# Patient Record
Sex: FEMALE | URBAN - NONMETROPOLITAN AREA
[De-identification: names, ages, dates, MRNs, and addresses within clinical notes are randomized per-mention and may not be internally consistent; named-entity substitution may affect disease eponyms.]

---

## 2020-10-01 ENCOUNTER — APPOINTMENT (RX ONLY)
Dept: URBAN - NONMETROPOLITAN AREA CLINIC 27 | Facility: CLINIC | Age: 30
Setting detail: DERMATOLOGY
End: 2020-10-01

## 2020-10-01 DIAGNOSIS — L24 IRRITANT CONTACT DERMATITIS: ICD-10-CM

## 2020-10-01 PROBLEM — L24.9 IRRITANT CONTACT DERMATITIS, UNSPECIFIED CAUSE: Status: ACTIVE | Noted: 2020-10-01

## 2020-10-01 PROCEDURE — ? PRESCRIPTION

## 2020-10-01 PROCEDURE — ? PATIENT SPECIFIC COUNSELING

## 2020-10-01 PROCEDURE — 99202 OFFICE O/P NEW SF 15 MIN: CPT

## 2020-10-01 PROCEDURE — ? COUNSELING

## 2020-10-01 RX ORDER — HYDROCORTISONE 25 MG/G
CREAM TOPICAL
Qty: 1 | Refills: 1 | Status: ERX | COMMUNITY
Start: 2020-10-01

## 2020-10-01 RX ADMIN — HYDROCORTISONE: 25 CREAM TOPICAL at 00:00

## 2020-10-01 ASSESSMENT — LOCATION SIMPLE DESCRIPTION DERM: LOCATION SIMPLE: LEFT CHEEK

## 2020-10-01 ASSESSMENT — LOCATION ZONE DERM: LOCATION ZONE: FACE

## 2020-10-01 ASSESSMENT — LOCATION DETAILED DESCRIPTION DERM: LOCATION DETAILED: LEFT INFERIOR CENTRAL MALAR CHEEK

## 2020-10-01 NOTE — PROCEDURE: PATIENT SPECIFIC COUNSELING
Detail Level: Detailed
Acute onset pruritic pink scaly patches on the eyelid and cheeks that has resolved. Rash is not present today though she has photos from 3 weeks earlier. Improved with stopping beauty products and using only bland emollients, such as Vaseline. \\nRecommended taking an Allegra or Zyrtec daily and a Benadryl at night if recurs. Apply hydrocortisone 2.5% cream at earliest sign of recurrence. \\nWill plan to schedule patch testing in our office.\\nHold now but after testing can slowly add back in vitamin C or just use the gentle products for now since she is getting  in December. Advised patient to bring her products with her next time for review.

## 2020-10-06 ENCOUNTER — APPOINTMENT (RX ONLY)
Dept: URBAN - NONMETROPOLITAN AREA CLINIC 27 | Facility: CLINIC | Age: 30
Setting detail: DERMATOLOGY
End: 2020-10-06

## 2020-10-06 DIAGNOSIS — L23.9 ALLERGIC CONTACT DERMATITIS, UNSPECIFIED CAUSE: ICD-10-CM

## 2020-10-06 PROCEDURE — 95044 PATCH/APPLICATION TESTS: CPT

## 2020-10-06 PROCEDURE — ? PATCH TESTING

## 2020-10-06 NOTE — PROCEDURE: PATCH TESTING
Post-Care Instructions: I reviewed with the patient in detail post-care instructions. Patient should not sweat, pick at, or get the patches wet for 48 hours.
Number Of Patches (Maximum Allowable Per Dos By Cms Is 90): 80
Detail Level: None
Consent: Verbal consent obtained, risks reviewed including but not limited to rash, itching, allergic reaction, systemic rash, remote possibility of anaphylaxis to allergen.

## 2020-10-08 ENCOUNTER — APPOINTMENT (RX ONLY)
Dept: URBAN - NONMETROPOLITAN AREA CLINIC 27 | Facility: CLINIC | Age: 30
Setting detail: DERMATOLOGY
End: 2020-10-08

## 2020-10-08 DIAGNOSIS — L73.8 OTHER SPECIFIED FOLLICULAR DISORDERS: ICD-10-CM

## 2020-10-08 DIAGNOSIS — L23.9 ALLERGIC CONTACT DERMATITIS, UNSPECIFIED CAUSE: ICD-10-CM

## 2020-10-08 PROCEDURE — ? COUNSELING

## 2020-10-08 PROCEDURE — ? NORTH AMERICAN 80 PATCH TEST READING

## 2020-10-08 PROCEDURE — 99213 OFFICE O/P EST LOW 20 MIN: CPT

## 2020-10-08 ASSESSMENT — LOCATION SIMPLE DESCRIPTION DERM: LOCATION SIMPLE: RIGHT CHEEK

## 2020-10-08 ASSESSMENT — LOCATION ZONE DERM: LOCATION ZONE: FACE

## 2020-10-08 ASSESSMENT — LOCATION DETAILED DESCRIPTION DERM: LOCATION DETAILED: RIGHT INFERIOR MEDIAL MALAR CHEEK

## 2020-10-08 NOTE — PROCEDURE: MIPS QUALITY
Quality 431: Preventive Care And Screening: Unhealthy Alcohol Use - Screening: Patient screened for unhealthy alcohol use using a single question and scores less than 2 times per year
Quality 226: Preventive Care And Screening: Tobacco Use: Screening And Cessation Intervention: Patient screened for tobacco use and is an ex/non-smoker
Quality 130: Documentation Of Current Medications In The Medical Record: Current Medications Documented
Quality 265: Biopsy Follow-Up: Biopsy results reviewed, communicated, tracked, and documented
Detail Level: Generalized

## 2020-10-08 NOTE — PROCEDURE: [employer]ICAN 80 PATCH TEST READING
Allergen 63 Reaction: no reaction
Name Of Allergen 48: Textile dye mix
Name Of Allergen 80: Oleamidopropyl Dimethylamine
Name Of Allergen 20: Nickelsulfate hexahydrate
Name Of Allergen 36: Ethyleneurea, melamine formaldehyde mix
Name Of Allergen 63: Iodopropynyl butyl carbamate
Name Of Allergen 35: Chloroxylenol (PCMX) / (4?Chloro?3,5?xylenol (PCMX))
Show Negative Results In The Note?: Yes
Name Of Allergen 22: Tocopherol/ (DL alpha tocopherol)
What Reading Time Point?: 48 hour
Name Of Allergen 58: Benzyl alcohol
Name Of Allergen 37: 2?tert?Butyl?4?methoxyphenol (BHA)
Allergen 48 Reaction: irritant reaction
Name Of Allergen 51: Disperse Yellow 3
Name Of Allergen 2: 2?Mercaptobenzothiazole (MBT)
Name Of Allergen 56: DMDM Hydantoin
Name Of Allergen 4: P?Phenylenediamine (PPD)
Name Of Allergen 32: Thimerosal (Merthiolate)
Name Of Allergen 40: Glyceryl monothioglycolate (GMTG)
Name Of Allergen 13: Epoxy resin Bisphenol A
Name Of Allergen 72: Hydroxyisohexyl 3?CyclohexeneCarboxaldehyde (Lyral)
Name Of Allergen 3: Colophonium / (Colophony)
Name Of Allergen 19: Myroxylon pereirae resin / (Balsam [place])
Name Of Allergen 10: Thiuram mix
Name Of Allergen 49: Tea Tree Oil
Name Of Allergen 70: Benzoyl Peroxide
Name Of Allergen 15: 4?tert?Butylphenolformaldehyde resin
Name Of Allergen 47: Triethanolamine
Name Of Allergen 50: Fragrance Mix II
Name Of Allergen 16: Mercapto mix
Name Of Allergen 24: Mixed dialkyl thiourea
Name Of Allergen 14: Quaternium?15 (Dowicil 200) / (1?(3?Chloroallyl)?3,5,7?triaza?1azoniaadamantane chloride)
Name Of Allergen 65: Disperse Blue 106/124 Mix
Name Of Allergen 60: Hydroperoxides of Limonene
Name Of Allergen 31: Sesquiterpene lactone mix
Name Of Allergen 79: Propylene glycol
Name Of Allergen 44: Tixocortol?21?pivalate
Name Of Allergen 23: Bacitracin
Name Of Allergen 53: Decyl Glucoside
Name Of Allergen 62: Polysorbate 80 / (Polyoxyethylenesorbitanmonooleate(Tween 80))
Name Of Allergen 25: Disperse Orange 3
Detail Level: Zone
Name Of Allergen 78: Methylisothiazolinone + Methylchloroisothiazolinone / (Cl+?Meisothiazolinone ([place] CG 200ppm))
Name Of Allergen 39: Ethyl acrylate
Name Of Allergen 71: Isoamyl?p?methoxycinnamate
Name Of Allergen 26: Paraben Mix
Name Of Allergen 28: Fragrance mix
Show Allergen Counseling In The Note?: No
Name Of Allergen 18: Potassium dichromate
Name Of Allergen 76: Cocamidopropylbetaine
Name Of Allergen 33: Propolis
Name Of Allergen 74: Hydroperoxides of Linalool
Name Of Allergen 9: Neomycin sulfate
Name Of Allergen 30: 2?Bromo?2?nitropropane?l,3?diol (Bronopol)
Name Of Allergen 46: Cocamide KATLIN / (Coconut diethanolamide)
Name Of Allergen 29: Glutaral / (Glutaraldehyde)
Name Of Allergen 77: Formaldehyde
Name Of Allergen 64: 2?n?Octyl?4?isothiazolin?3?one
Name Of Allergen 8: Carba mix
Name Of Allergen 54: METHYLISOTHIAZOLINONE
Name Of Allergen 45: B?033A Budesonide
Name Of Allergen 1: Benzocaine
Name Of Allergen 21: Diazolidinylurea (Germall II)
Name Of Allergen 67: Lidocaine
Name Of Allergen 61: Desoximetasone
Name Of Allergen 27: Methyldibromo glutaronitrile (MDBGN)
Name Of Allergen 43: Cobalt(II) chloride hexahydrate
Name Of Allergen 75: Amidoamine
Name Of Allergen 69: Dibucaine hydrochloride
Name Of Allergen 34: Benzophenone?3 / (2?Hydroxy?4?methoxybenzophenone)
Name Of Allergen 5: Imidazolidinyl urea Germall 115)
Name Of Allergen 42: Methyl methacrylate
Name Of Allergen 52: Benzyl salicylate
Name Of Allergen 6: Cinnamal / (Cinnamic aldehyde)
Name Of Allergen 66: Compositae Mix II
Name Of Allergen 73: Ethylhexyl Salicylate
Name Of Allergen 59: Isopropyl myristate
Name Of Allergen 7: Amerchol L 101
Name Of Allergen 38: Gold(I)sodium thiosulfate dihydrate
Name Of Allergen 17: N,N?Diphenylguanidine
Name Of Allergen 11: Clobetasol?17?propionate
Name Of Allergen 57: [place] odorata oil / (Ylang?Ylang oil)
Name Of Allergen 55: HEMA (2?Hydroxyethyl methacrylate)
Number Of Patches Read: 80
Name Of Allergen 41: Toluenesulfonamide formaldehyde resin
Name Of Allergen 68: Fusidic acid sodium salt
Name Of Allergen 12: Ethylenediamine dihydrochloride

## 2020-10-13 ENCOUNTER — APPOINTMENT (RX ONLY)
Dept: URBAN - NONMETROPOLITAN AREA CLINIC 27 | Facility: CLINIC | Age: 30
Setting detail: DERMATOLOGY
End: 2020-10-13

## 2020-10-13 DIAGNOSIS — L23.9 ALLERGIC CONTACT DERMATITIS, UNSPECIFIED CAUSE: ICD-10-CM

## 2020-10-13 DIAGNOSIS — L73.8 OTHER SPECIFIED FOLLICULAR DISORDERS: ICD-10-CM

## 2020-10-13 PROCEDURE — ? PATIENT SPECIFIC COUNSELING

## 2020-10-13 PROCEDURE — ? NORTH AMERICAN 80 PATCH TEST READING

## 2020-10-13 PROCEDURE — 99212 OFFICE O/P EST SF 10 MIN: CPT

## 2020-10-13 PROCEDURE — ? BENIGN DESTRUCTION COSMETIC

## 2020-10-13 PROCEDURE — ? COUNSELING

## 2020-10-13 ASSESSMENT — LOCATION ZONE DERM
LOCATION ZONE: FACE
LOCATION ZONE: TRUNK

## 2020-10-13 ASSESSMENT — LOCATION SIMPLE DESCRIPTION DERM
LOCATION SIMPLE: UPPER BACK
LOCATION SIMPLE: RIGHT CHEEK

## 2020-10-13 ASSESSMENT — LOCATION DETAILED DESCRIPTION DERM
LOCATION DETAILED: INFERIOR THORACIC SPINE
LOCATION DETAILED: RIGHT INFERIOR MEDIAL MALAR CHEEK

## 2020-10-13 NOTE — PROCEDURE: [employer]ICAN 80 PATCH TEST READING
Name Of Allergen 62: Polysorbate 80 / (Polyoxyethylenesorbitanmonooleate(Tween 80))
Name Of Allergen 21: Diazolidinylurea (Germall II)
Allergen 53 Reaction: no reaction
Name Of Allergen 9: Neomycin sulfate
Name Of Allergen 46: Cocamide KATLIN / (Coconut diethanolamide)
Name Of Allergen 50: Fragrance Mix II
Name Of Allergen 34: Benzophenone?3 / (2?Hydroxy?4?methoxybenzophenone)
Name Of Allergen 32: Thimerosal (Merthiolate)
Name Of Allergen 17: N,N?Diphenylguanidine
Name Of Allergen 26: Paraben Mix
Name Of Allergen 8: Carba mix
Name Of Allergen 55: HEMA (2?Hydroxyethyl methacrylate)
Name Of Allergen 78: Methylisothiazolinone + Methylchloroisothiazolinone / (Cl+?Meisothiazolinone ([place] CG 200ppm))
Name Of Allergen 80: Oleamidopropyl Dimethylamine
Name Of Allergen 44: Tixocortol?21?pivalate
Allergen 42 Reaction: +/-
Name Of Allergen 75: Amidoamine
Name Of Allergen 4: P?Phenylenediamine (PPD)
Name Of Allergen 53: Decyl Glucoside
Name Of Allergen 65: Disperse Blue 106/124 Mix
Name Of Allergen 59: Isopropyl myristate
Name Of Allergen 38: Gold(I)sodium thiosulfate dihydrate
Name Of Allergen 29: Glutaral / (Glutaraldehyde)
Name Of Allergen 57: [place] odorata oil / (Ylang?Ylang oil)
Name Of Allergen 58: Benzyl alcohol
Name Of Allergen 76: Cocamidopropylbetaine
Name Of Allergen 36: Ethyleneurea, melamine formaldehyde mix
Name Of Allergen 18: Potassium dichromate
Allergen 48 Reaction: irritant reaction
Name Of Allergen 31: Sesquiterpene lactone mix
Name Of Allergen 61: Desoximetasone
Name Of Allergen 72: Hydroxyisohexyl 3?CyclohexeneCarboxaldehyde (Lyral)
Name Of Allergen 35: Chloroxylenol (PCMX) / (4?Chloro?3,5?xylenol (PCMX))
Name Of Allergen 12: Ethylenediamine dihydrochloride
Name Of Allergen 77: Formaldehyde
Name Of Allergen 16: Mercapto mix
Name Of Allergen 45: B?033A Budesonide
Name Of Allergen 56: DMDM Hydantoin
Name Of Allergen 6: Cinnamal / (Cinnamic aldehyde)
Name Of Allergen 68: Fusidic acid sodium salt
Name Of Allergen 60: Hydroperoxides of Limonene
Show Negative Results In The Note?: Yes
Name Of Allergen 79: Propylene glycol
Name Of Allergen 54: METHYLISOTHIAZOLINONE
Name Of Allergen 71: Isoamyl?p?methoxycinnamate
Name Of Allergen 47: Triethanolamine
Name Of Allergen 52: Benzyl salicylate
Name Of Allergen 22: Tocopherol/ (DL alpha tocopherol)
What Reading Time Point?: 48 hour
Name Of Allergen 74: Hydroperoxides of Linalool
Name Of Allergen 28: Fragrance mix
Name Of Allergen 3: Colophonium / (Colophony)
Name Of Allergen 40: Glyceryl monothioglycolate (GMTG)
Detail Level: Zone
Name Of Allergen 43: Cobalt(II) chloride hexahydrate
Name Of Allergen 25: Disperse Orange 3
Name Of Allergen 66: Compositae Mix II
Name Of Allergen 2: 2?Mercaptobenzothiazole (MBT)
Name Of Allergen 39: Ethyl acrylate
Name Of Allergen 14: Quaternium?15 (Dowicil 200) / (1?(3?Chloroallyl)?3,5,7?triaza?1azoniaadamantane chloride)
Name Of Allergen 7: Amerchol L 101
Name Of Allergen 67: Lidocaine
Name Of Allergen 64: 2?n?Octyl?4?isothiazolin?3?one
Name Of Allergen 51: Disperse Yellow 3
Name Of Allergen 48: Textile dye mix
Name Of Allergen 73: Ethylhexyl Salicylate
Name Of Allergen 19: Myroxylon pereirae resin / (Balsam [place])
Name Of Allergen 70: Benzoyl Peroxide
Show Allergen Counseling In The Note?: No
Name Of Allergen 5: Imidazolidinyl urea Germall 115)
Name Of Allergen 15: 4?tert?Butylphenolformaldehyde resin
Number Of Patches Read: 80
Name Of Allergen 11: Clobetasol?17?propionate
Name Of Allergen 23: Bacitracin
Name Of Allergen 20: Nickelsulfate hexahydrate
Name Of Allergen 37: 2?tert?Butyl?4?methoxyphenol (BHA)
Name Of Allergen 1: Benzocaine
Name Of Allergen 24: Mixed dialkyl thiourea
Name Of Allergen 33: Propolis
Name Of Allergen 42: Methyl methacrylate
Name Of Allergen 49: Tea Tree Oil
Name Of Allergen 30: 2?Bromo?2?nitropropane?l,3?diol (Bronopol)
Name Of Allergen 13: Epoxy resin Bisphenol A
Name Of Allergen 10: Thiuram mix
Name Of Allergen 69: Dibucaine hydrochloride
Name Of Allergen 41: Toluenesulfonamide formaldehyde resin
Name Of Allergen 27: Methyldibromo glutaronitrile (MDBGN)
Name Of Allergen 63: Iodopropynyl butyl carbamate

## 2020-10-13 NOTE — PROCEDURE: PATIENT SPECIFIC COUNSELING
Irritant reaction to fragrance mix was not present on exam today. MMA was 1+, weakly positive. Patient does have gel nails, could explain rash on eyelids and face. Patient will begin reintroducing products into her routine, one at a time.
Detail Level: Detailed

## 2020-10-13 NOTE — PROCEDURE: MIPS QUALITY
Quality 226: Preventive Care And Screening: Tobacco Use: Screening And Cessation Intervention: Patient screened for tobacco use and is an ex/non-smoker
Detail Level: Generalized
Quality 265: Biopsy Follow-Up: Biopsy results reviewed, communicated, tracked, and documented
Quality 431: Preventive Care And Screening: Unhealthy Alcohol Use - Screening: Patient screened for unhealthy alcohol use using a single question and scores less than 2 times per year
Quality 130: Documentation Of Current Medications In The Medical Record: Current Medications Documented

## 2020-10-13 NOTE — PROCEDURE: BENIGN DESTRUCTION COSMETIC
Detail Level: Detailed
Consent: The patient's (and/or patient’s guardian) consent was obtained including but not limited to risks of crusting, scabbing, blistering, scarring, darker or lighter pigmentary change, recurrence, incomplete removal and infection.
Anesthesia Volume In Cc: 0.2
Anesthesia Type: 1% lidocaine with 1:100,000 epinephrine
Price (Use Numbers Only, No Special Characters Or $): 0
Post-Care Instructions: I reviewed with the patient in detail post-care instructions. Patient is to wear sunprotection, and avoid picking at any of lesions.